# Patient Record
(demographics unavailable — no encounter records)

---

## 2024-10-29 NOTE — ADDENDUM
[FreeTextEntry1] : I, Sean Garcia Jr, acted solely as a scribe for Dr. Jake Sanchez on this date 10/29/2024  All medical record entries made by the Scribe were at my, Dr. Jake Sanchez, direction and personally dictated by me on 10/29/2024 . I have reviewed the chart and agree that the record accurately reflects my personal performance of the history, physical exam, assessment and plan. I have also personally directed, reviewed, and agreed with the chart.

## 2024-10-29 NOTE — PHYSICAL EXAM
[de-identified] : Patient is WDWN, alert, and in no acute distress. Breathing is unlabored. He is grossly oriented to person, place, and time.   Left Knee: There is medial & lateral joint line tenderness to palpation. No swelling, no valgus or valgus instability present on provocative testing.  Range of motion: Active flexion and extension are full and painless. No crepitus.  Tests and Signs: All tests for stability are normal.  Strength: flexion and extension 5/5  [de-identified] : AP, lateral and oblique views of the LEFT knee were obtained today and revealed normal alignment, no fractures or dislocation noted. Mild arthritis.

## 2024-10-29 NOTE — DISCUSSION/SUMMARY
[de-identified] : The underlying pathophysiology was reviewed with the patient. XR films were reviewed with the patient. Discussed at length the nature of the patients condition. Their left knee symptoms appear secondary to a potential meniscus tear. The patient and I discussed his options regarding treatment.   Patient was advised to take OTC medications and topical analgesic for pain management.  I expressed to the patient that at this time, it would be in his best interest to obtain additional studies in the form of an MRI of his left knee to evaluate for a meniscus tear. I provided the patient a prescription to obtain an MRI.   The patient wishes to proceed with a cortisone injection at this time. The skin was prepped with alcohol and sprayed with Ethyl Chloride. An injection of 5 cc 1% Lidocaine without epinephrine, 0.5 cc Kenalog 40mg, and 0.5 cc. Dexamethasone was administered into the left knee. The patient tolerated the procedure well. Apply ice.  All questions answered, understanding verbalized. Patient in agreement with plan of care.  Follow-up after MRI.

## 2024-10-29 NOTE — HISTORY OF PRESENT ILLNESS
[de-identified] : Pt is a 71 y/o male c/o left knee pain.  He had a gel injection in [place] 6 months ago but it did not help.  He has pain with walking and standing.  It is difficult to stand from sitting.  Denies buckling, locking, numbness or tingling. He had a cortisone injection many years ago which helped.

## 2025-02-03 NOTE — COUNSELING
[Fall prevention counseling provided] : Fall prevention counseling provided [Adequate lighting] : Adequate lighting [No throw rugs] : No throw rugs [Use proper foot wear] : Use proper foot wear [Behavioral health counseling provided] : Behavioral health counseling provided [Sleep ___ hours/day] : Sleep [unfilled] hours/day [Engage in a relaxing activity] : Engage in a relaxing activity [Plan in advance] : Plan in advance [AUDIT-C Screening administered and reviewed] : AUDIT-C Screening administered and reviewed [Hazards of at-risk alcohol use discussed] : Hazards of at-risk alcohol use discussed [Strategies to reduce or eliminate alcohol use discussed] : Strategies to reduce or eliminate alcohol use discussed [Support options provided] : Support options provided [Potential consequences of obesity discussed] : Potential consequences of obesity discussed [Benefits of weight loss discussed] : Benefits of weight loss discussed [Structured Weight Management Program suggested:] : Structured weight management program suggested [Encouraged to maintain food diary] : Encouraged to maintain food diary [Encouraged to increase physical activity] : Encouraged to increase physical activity [Encouraged to use exercise tracking device] : Encouraged to use exercise tracking device [Target Wt Loss Goal ___] : Weight Loss Goals: Target weight loss goal [unfilled] lbs [Weigh Self Weekly] : weigh self weekly [Decrease Portions] : decrease portions [____ min/wk Activity] : [unfilled] min/wk activity [Keep Food Diary] : keep food diary [FreeTextEntry2] : Patient declines

## 2025-02-03 NOTE — ASSESSMENT
[FreeTextEntry1] : Assessment and plan:  1.  Anxiety/depression continue Lexapro 20 mg p.o. daily patient states that the medication has been keeping him even keeled.  He has no suicidal or homicidal ideation.  2.  Hypertension excellent blood pressure control with the combination of amlodipine 5 mg and losartan hydrochlorothiazide 100-25 mg.  3.  Degenerative joint disease with bilateral knee pain actually patient states that his knee pain had worsened but has gone to orthopedics and has had intra-articular joint injections with steroids and feeling much better.  4.  Dyspnea on exertion and underlying obstructive sleep apnea patient has Breo Ellipta available and uses CPAP for his obstructive sleep apnea.  Patient also has a restrictive pattern due to morbid obesity.  5.  Morbid obesity patient has gained quite a bit of weight during his stay in [facility] my recommendations are that he needs to lose 100 pounds at least to do with slowly followed good weight loss program recommendations were given patient is agreeable but he is very noncompliant.  6.  Patient is followed closely by cardiology has a history of hyperlipidemia unfortunately patient has attempted both atorvastatin and rosuvastatin cannot tolerate the medications due to severe muscle pain.  I do recommend low-fat low cholesterol diet, increase physical activity as tolerated and weight loss program.  7.  Comprehensive blood work drawn in office by examiner.  8.  Total time spent face-to-face and non-face-to-face time 45 minutes the majority of that time was spent on counseling and coordination of care.

## 2025-02-03 NOTE — REVIEW OF SYSTEMS
[Dyspnea on Exertion] : dyspnea on exertion [Negative] : Heme/Lymph [Shortness Of Breath] : no shortness of breath [Wheezing] : no wheezing [Cough] : no cough [Headache] : no headache [Fainting] : no fainting [Confusion] : no confusion [Memory Loss] : no memory loss

## 2025-02-03 NOTE — HEALTH RISK ASSESSMENT
[Yes] : Yes [4 or more  times a week (4 pts)] : 4 or more  times a week (4 points) [1 or 2 (0 pts)] : 1 or 2 (0 points) [Less than monthly (1 pt)] : Less than monthly (1 point) [No] : In the past 12 months have you used drugs other than those required for medical reasons? No [No falls in past year] : Patient reported no falls in the past year [Little interest or pleasure doing things] : 1) Little interest or pleasure doing things [Feeling down, depressed, or hopeless] : 2) Feeling down, depressed, or hopeless [0] : 2) Feeling down, depressed, or hopeless: Not at all (0) [PHQ-2 Negative - No further assessment needed] : PHQ-2 Negative - No further assessment needed [Reviewed no changes] : Reviewed, no changes [Designated Healthcare Proxy] : Designated healthcare proxy [Relationship: ___] : Relationship: [unfilled] [Aggressive treatment] : aggressive treatment [I will adhere to the patient's wishes.] : I will adhere to the patient's wishes. [Former] : Former [20 or more] : 20 or more [> 15 Years] : > 15 Years [Audit-CScore] : 5 [[id number]] : 0 [AdvancecareDate] : 02/25

## 2025-02-03 NOTE — HISTORY OF PRESENT ILLNESS
[FreeTextEntry1] : Please see HPI [de-identified] : Patient is a 72-year-old gentleman who presents today for follow-up and disease management.  Patient has multiple underlying medical problems which include anxiety, hypertension, dyspnea, gastroesophageal reflux, chronic insomnia, degenerative joint disease, recurrent vertigo, hyperlipidemia with hepatic steatosis and elevated liver function test, hyperglycemia with elevated hemoglobin A1c, obesity and history of prostate cancer which was diagnosed in December 2019.  Patient states that he is in his usual state of health, he was last seen November 10, 2023 here in office at today's visit I will be obtaining comprehensive blood work and medicine reconciliation will be done.  Patient spends part of the year in [place].